# Patient Record
Sex: FEMALE | Race: BLACK OR AFRICAN AMERICAN | NOT HISPANIC OR LATINO | Employment: OTHER | URBAN - METROPOLITAN AREA
[De-identification: names, ages, dates, MRNs, and addresses within clinical notes are randomized per-mention and may not be internally consistent; named-entity substitution may affect disease eponyms.]

---

## 2025-05-01 ENCOUNTER — APPOINTMENT (OUTPATIENT)
Dept: RADIOLOGY | Facility: HOSPITAL | Age: 89
End: 2025-05-01
Payer: MEDICARE

## 2025-05-01 ENCOUNTER — HOSPITAL ENCOUNTER (OUTPATIENT)
Facility: HOSPITAL | Age: 89
Setting detail: OBSERVATION
Discharge: HOME | End: 2025-05-02
Attending: EMERGENCY MEDICINE | Admitting: STUDENT IN AN ORGANIZED HEALTH CARE EDUCATION/TRAINING PROGRAM
Payer: MEDICARE

## 2025-05-01 ENCOUNTER — CLINICAL SUPPORT (OUTPATIENT)
Dept: EMERGENCY MEDICINE | Facility: HOSPITAL | Age: 89
End: 2025-05-01
Payer: MEDICARE

## 2025-05-01 DIAGNOSIS — I10 PRIMARY HYPERTENSION: ICD-10-CM

## 2025-05-01 DIAGNOSIS — I26.99 BILATERAL PULMONARY EMBOLISM (MULTI): Primary | ICD-10-CM

## 2025-05-01 DIAGNOSIS — I26.99 PE (PULMONARY THROMBOEMBOLISM) (MULTI): ICD-10-CM

## 2025-05-01 DIAGNOSIS — I26.94 MULTIPLE SUBSEGMENTAL PULMONARY EMBOLI WITHOUT ACUTE COR PULMONALE: ICD-10-CM

## 2025-05-01 PROBLEM — H40.003 GLAUCOMA SUSPECT, BOTH EYES: Status: ACTIVE | Noted: 2025-05-01

## 2025-05-01 PROBLEM — N18.31 STAGE 3A CHRONIC KIDNEY DISEASE (MULTI): Status: ACTIVE | Noted: 2025-05-01

## 2025-05-01 PROBLEM — E78.5 DYSLIPIDEMIA: Status: ACTIVE | Noted: 2025-05-01

## 2025-05-01 PROBLEM — D64.9 ANEMIA: Status: ACTIVE | Noted: 2025-05-01

## 2025-05-01 PROBLEM — H26.9 CATARACT: Status: ACTIVE | Noted: 2025-05-01

## 2025-05-01 PROBLEM — M85.80 OSTEOPENIA: Status: ACTIVE | Noted: 2025-05-01

## 2025-05-01 LAB
ALBUMIN SERPL BCP-MCNC: 3.7 G/DL (ref 3.4–5)
ALP SERPL-CCNC: 76 U/L (ref 33–136)
ALT SERPL W P-5'-P-CCNC: 10 U/L (ref 7–45)
ANION GAP BLDV CALCULATED.4IONS-SCNC: 10 MMOL/L (ref 10–25)
ANION GAP SERPL CALC-SCNC: 14 MMOL/L (ref 10–20)
AST SERPL W P-5'-P-CCNC: 14 U/L (ref 9–39)
ATRIAL RATE: 86 BPM
BASE EXCESS BLDV CALC-SCNC: -1.1 MMOL/L (ref -2–3)
BASOPHILS # BLD AUTO: 0.02 X10*3/UL (ref 0–0.1)
BASOPHILS NFR BLD AUTO: 0.3 %
BILIRUB SERPL-MCNC: 0.5 MG/DL (ref 0–1.2)
BNP SERPL-MCNC: 166 PG/ML (ref 0–99)
BODY TEMPERATURE: 37 DEGREES CELSIUS
BUN SERPL-MCNC: 19 MG/DL (ref 6–23)
CA-I BLDV-SCNC: 1.15 MMOL/L (ref 1.1–1.33)
CALCIUM SERPL-MCNC: 8.8 MG/DL (ref 8.6–10.6)
CARDIAC TROPONIN I PNL SERPL HS: 41 NG/L (ref 0–34)
CARDIAC TROPONIN I PNL SERPL HS: 60 NG/L (ref 0–34)
CHLORIDE BLDV-SCNC: 105 MMOL/L (ref 98–107)
CHLORIDE SERPL-SCNC: 104 MMOL/L (ref 98–107)
CO2 SERPL-SCNC: 23 MMOL/L (ref 21–32)
CREAT SERPL-MCNC: 1.13 MG/DL (ref 0.5–1.05)
D DIMER PPP FEU-MCNC: 7604 NG/ML FEU
EGFRCR SERPLBLD CKD-EPI 2021: 47 ML/MIN/1.73M*2
EOSINOPHIL # BLD AUTO: 0.02 X10*3/UL (ref 0–0.4)
EOSINOPHIL NFR BLD AUTO: 0.3 %
ERYTHROCYTE [DISTWIDTH] IN BLOOD BY AUTOMATED COUNT: 13.3 % (ref 11.5–14.5)
GLUCOSE BLDV-MCNC: 96 MG/DL (ref 74–99)
GLUCOSE SERPL-MCNC: 97 MG/DL (ref 74–99)
HCO3 BLDV-SCNC: 23.5 MMOL/L (ref 22–26)
HCT VFR BLD AUTO: 31 % (ref 36–46)
HCT VFR BLD EST: 35 % (ref 36–46)
HGB BLD-MCNC: 10.7 G/DL (ref 12–16)
HGB BLDV-MCNC: 11.5 G/DL (ref 12–16)
IMM GRANULOCYTES # BLD AUTO: 0.03 X10*3/UL (ref 0–0.5)
IMM GRANULOCYTES NFR BLD AUTO: 0.4 % (ref 0–0.9)
INHALED O2 CONCENTRATION: 36 %
LACTATE BLDV-SCNC: 1.5 MMOL/L (ref 0.4–2)
LYMPHOCYTES # BLD AUTO: 0.75 X10*3/UL (ref 0.8–3)
LYMPHOCYTES NFR BLD AUTO: 10.5 %
MCH RBC QN AUTO: 30.5 PG (ref 26–34)
MCHC RBC AUTO-ENTMCNC: 34.5 G/DL (ref 32–36)
MCV RBC AUTO: 88 FL (ref 80–100)
MONOCYTES # BLD AUTO: 0.58 X10*3/UL (ref 0.05–0.8)
MONOCYTES NFR BLD AUTO: 8.1 %
NEUTROPHILS # BLD AUTO: 5.75 X10*3/UL (ref 1.6–5.5)
NEUTROPHILS NFR BLD AUTO: 80.4 %
NRBC BLD-RTO: 0 /100 WBCS (ref 0–0)
OXYHGB MFR BLDV: 59.1 % (ref 45–75)
P AXIS: 80 DEGREES
P OFFSET: 198 MS
P ONSET: 136 MS
PCO2 BLDV: 38 MM HG (ref 41–51)
PH BLDV: 7.4 PH (ref 7.33–7.43)
PLATELET # BLD AUTO: 257 X10*3/UL (ref 150–450)
PO2 BLDV: 39 MM HG (ref 35–45)
POTASSIUM BLDV-SCNC: 4.1 MMOL/L (ref 3.5–5.3)
POTASSIUM SERPL-SCNC: 4 MMOL/L (ref 3.5–5.3)
PR INTERVAL: 166 MS
PROT SERPL-MCNC: 7.2 G/DL (ref 6.4–8.2)
Q ONSET: 219 MS
QRS COUNT: 14 BEATS
QRS DURATION: 88 MS
QT INTERVAL: 374 MS
QTC CALCULATION(BAZETT): 447 MS
QTC FREDERICIA: 421 MS
R AXIS: 24 DEGREES
RBC # BLD AUTO: 3.51 X10*6/UL (ref 4–5.2)
SAO2 % BLDV: 61 % (ref 45–75)
SODIUM BLDV-SCNC: 134 MMOL/L (ref 136–145)
SODIUM SERPL-SCNC: 137 MMOL/L (ref 136–145)
T AXIS: 54 DEGREES
T OFFSET: 406 MS
UFH PPP CHRO-ACNC: 1 IU/ML (ref ?–1.1)
VENTRICULAR RATE: 86 BPM
WBC # BLD AUTO: 7.2 X10*3/UL (ref 4.4–11.3)

## 2025-05-01 PROCEDURE — G0378 HOSPITAL OBSERVATION PER HR: HCPCS

## 2025-05-01 PROCEDURE — 83880 ASSAY OF NATRIURETIC PEPTIDE: CPT

## 2025-05-01 PROCEDURE — 2500000001 HC RX 250 WO HCPCS SELF ADMINISTERED DRUGS (ALT 637 FOR MEDICARE OP)

## 2025-05-01 PROCEDURE — 85520 HEPARIN ASSAY: CPT

## 2025-05-01 PROCEDURE — 84484 ASSAY OF TROPONIN QUANT: CPT

## 2025-05-01 PROCEDURE — 71275 CT ANGIOGRAPHY CHEST: CPT | Mod: FOREIGN READ | Performed by: RADIOLOGY

## 2025-05-01 PROCEDURE — 82330 ASSAY OF CALCIUM: CPT | Performed by: EMERGENCY MEDICINE

## 2025-05-01 PROCEDURE — 99223 1ST HOSP IP/OBS HIGH 75: CPT | Performed by: NURSE PRACTITIONER

## 2025-05-01 PROCEDURE — 85025 COMPLETE CBC W/AUTO DIFF WBC: CPT

## 2025-05-01 PROCEDURE — 36415 COLL VENOUS BLD VENIPUNCTURE: CPT

## 2025-05-01 PROCEDURE — 71045 X-RAY EXAM CHEST 1 VIEW: CPT

## 2025-05-01 PROCEDURE — 2550000001 HC RX 255 CONTRASTS: Performed by: EMERGENCY MEDICINE

## 2025-05-01 PROCEDURE — 71045 X-RAY EXAM CHEST 1 VIEW: CPT | Mod: FOREIGN READ | Performed by: RADIOLOGY

## 2025-05-01 PROCEDURE — 93010 ELECTROCARDIOGRAM REPORT: CPT | Performed by: EMERGENCY MEDICINE

## 2025-05-01 PROCEDURE — 99285 EMERGENCY DEPT VISIT HI MDM: CPT | Mod: 25 | Performed by: EMERGENCY MEDICINE

## 2025-05-01 PROCEDURE — 84132 ASSAY OF SERUM POTASSIUM: CPT

## 2025-05-01 PROCEDURE — 2500000004 HC RX 250 GENERAL PHARMACY W/ HCPCS (ALT 636 FOR OP/ED): Mod: JZ

## 2025-05-01 PROCEDURE — 84132 ASSAY OF SERUM POTASSIUM: CPT | Performed by: EMERGENCY MEDICINE

## 2025-05-01 PROCEDURE — 93308 TTE F-UP OR LMTD: CPT | Performed by: SURGERY

## 2025-05-01 PROCEDURE — 82435 ASSAY OF BLOOD CHLORIDE: CPT

## 2025-05-01 PROCEDURE — 96376 TX/PRO/DX INJ SAME DRUG ADON: CPT

## 2025-05-01 PROCEDURE — 2500000001 HC RX 250 WO HCPCS SELF ADMINISTERED DRUGS (ALT 637 FOR MEDICARE OP): Performed by: NURSE PRACTITIONER

## 2025-05-01 PROCEDURE — 2500000001 HC RX 250 WO HCPCS SELF ADMINISTERED DRUGS (ALT 637 FOR MEDICARE OP): Performed by: STUDENT IN AN ORGANIZED HEALTH CARE EDUCATION/TRAINING PROGRAM

## 2025-05-01 PROCEDURE — 99285 EMERGENCY DEPT VISIT HI MDM: CPT | Performed by: EMERGENCY MEDICINE

## 2025-05-01 PROCEDURE — 93005 ELECTROCARDIOGRAM TRACING: CPT

## 2025-05-01 PROCEDURE — 36415 COLL VENOUS BLD VENIPUNCTURE: CPT | Performed by: EMERGENCY MEDICINE

## 2025-05-01 PROCEDURE — 71275 CT ANGIOGRAPHY CHEST: CPT

## 2025-05-01 PROCEDURE — 85379 FIBRIN DEGRADATION QUANT: CPT

## 2025-05-01 RX ORDER — CLONIDINE HYDROCHLORIDE 0.1 MG/1
0.1 TABLET ORAL ONCE
Status: COMPLETED | OUTPATIENT
Start: 2025-05-01 | End: 2025-05-01

## 2025-05-01 RX ORDER — AMOXICILLIN 250 MG
2 CAPSULE ORAL 2 TIMES DAILY
Status: DISCONTINUED | OUTPATIENT
Start: 2025-05-01 | End: 2025-05-02 | Stop reason: HOSPADM

## 2025-05-01 RX ORDER — HEPARIN SODIUM 10000 [USP'U]/100ML
0-4500 INJECTION, SOLUTION INTRAVENOUS CONTINUOUS
Status: DISCONTINUED | OUTPATIENT
Start: 2025-05-01 | End: 2025-05-02

## 2025-05-01 RX ORDER — HYDROCHLOROTHIAZIDE 25 MG/1
25 TABLET ORAL DAILY
Status: DISCONTINUED | OUTPATIENT
Start: 2025-05-01 | End: 2025-05-02 | Stop reason: HOSPADM

## 2025-05-01 RX ORDER — AMLODIPINE BESYLATE 10 MG/1
10 TABLET ORAL ONCE
Status: COMPLETED | OUTPATIENT
Start: 2025-05-01 | End: 2025-05-01

## 2025-05-01 RX ORDER — CHLORTHALIDONE 25 MG/1
1 TABLET ORAL DAILY
COMMUNITY
Start: 2019-04-08 | End: 2025-05-01 | Stop reason: ALTCHOICE

## 2025-05-01 RX ORDER — HYDROCHLOROTHIAZIDE 25 MG/1
25 TABLET ORAL DAILY
Status: ON HOLD | COMMUNITY
End: 2025-05-02

## 2025-05-01 RX ORDER — HYDRALAZINE HYDROCHLORIDE 25 MG/1
25 TABLET, FILM COATED ORAL EVERY 6 HOURS PRN
Status: DISCONTINUED | OUTPATIENT
Start: 2025-05-01 | End: 2025-05-02 | Stop reason: HOSPADM

## 2025-05-01 RX ADMIN — CLONIDINE HYDROCHLORIDE 0.1 MG: 0.1 TABLET ORAL at 20:39

## 2025-05-01 RX ADMIN — IOHEXOL 67 ML: 350 INJECTION, SOLUTION INTRAVENOUS at 11:54

## 2025-05-01 RX ADMIN — AMLODIPINE BESYLATE 10 MG: 10 TABLET ORAL at 11:26

## 2025-05-01 RX ADMIN — HEPARIN SODIUM 1100 UNITS/HR: 10000 INJECTION, SOLUTION INTRAVENOUS at 13:03

## 2025-05-01 RX ADMIN — HYDRALAZINE HYDROCHLORIDE 25 MG: 25 TABLET ORAL at 18:36

## 2025-05-01 RX ADMIN — HYDROCHLOROTHIAZIDE 25 MG: 25 TABLET ORAL at 15:58

## 2025-05-01 SDOH — ECONOMIC STABILITY: FOOD INSECURITY: WITHIN THE PAST 12 MONTHS, YOU WORRIED THAT YOUR FOOD WOULD RUN OUT BEFORE YOU GOT THE MONEY TO BUY MORE.: NEVER TRUE

## 2025-05-01 SDOH — SOCIAL STABILITY: SOCIAL INSECURITY: WITHIN THE LAST YEAR, HAVE YOU BEEN HUMILIATED OR EMOTIONALLY ABUSED IN OTHER WAYS BY YOUR PARTNER OR EX-PARTNER?: NO

## 2025-05-01 SDOH — SOCIAL STABILITY: SOCIAL INSECURITY: ABUSE: ADULT

## 2025-05-01 SDOH — SOCIAL STABILITY: SOCIAL INSECURITY: DO YOU FEEL UNSAFE GOING BACK TO THE PLACE WHERE YOU ARE LIVING?: NO

## 2025-05-01 SDOH — SOCIAL STABILITY: SOCIAL INSECURITY
WITHIN THE LAST YEAR, HAVE YOU BEEN KICKED, HIT, SLAPPED, OR OTHERWISE PHYSICALLY HURT BY YOUR PARTNER OR EX-PARTNER?: NO

## 2025-05-01 SDOH — ECONOMIC STABILITY: FOOD INSECURITY: HOW HARD IS IT FOR YOU TO PAY FOR THE VERY BASICS LIKE FOOD, HOUSING, MEDICAL CARE, AND HEATING?: NOT VERY HARD

## 2025-05-01 SDOH — SOCIAL STABILITY: SOCIAL INSECURITY: HAS ANYONE EVER THREATENED TO HURT YOUR FAMILY OR YOUR PETS?: NO

## 2025-05-01 SDOH — SOCIAL STABILITY: SOCIAL INSECURITY: HAVE YOU HAD ANY THOUGHTS OF HARMING ANYONE ELSE?: NO

## 2025-05-01 SDOH — ECONOMIC STABILITY: HOUSING INSECURITY: AT ANY TIME IN THE PAST 12 MONTHS, WERE YOU HOMELESS OR LIVING IN A SHELTER (INCLUDING NOW)?: NO

## 2025-05-01 SDOH — ECONOMIC STABILITY: TRANSPORTATION INSECURITY: IN THE PAST 12 MONTHS, HAS LACK OF TRANSPORTATION KEPT YOU FROM MEDICAL APPOINTMENTS OR FROM GETTING MEDICATIONS?: NO

## 2025-05-01 SDOH — SOCIAL STABILITY: SOCIAL INSECURITY: WITHIN THE LAST YEAR, HAVE YOU BEEN AFRAID OF YOUR PARTNER OR EX-PARTNER?: NO

## 2025-05-01 SDOH — SOCIAL STABILITY: SOCIAL INSECURITY: DO YOU FEEL ANYONE HAS EXPLOITED OR TAKEN ADVANTAGE OF YOU FINANCIALLY OR OF YOUR PERSONAL PROPERTY?: NO

## 2025-05-01 SDOH — ECONOMIC STABILITY: HOUSING INSECURITY: IN THE LAST 12 MONTHS, WAS THERE A TIME WHEN YOU WERE NOT ABLE TO PAY THE MORTGAGE OR RENT ON TIME?: NO

## 2025-05-01 SDOH — SOCIAL STABILITY: SOCIAL INSECURITY: WERE YOU ABLE TO COMPLETE ALL THE BEHAVIORAL HEALTH SCREENINGS?: YES

## 2025-05-01 SDOH — ECONOMIC STABILITY: INCOME INSECURITY: IN THE PAST 12 MONTHS HAS THE ELECTRIC, GAS, OIL, OR WATER COMPANY THREATENED TO SHUT OFF SERVICES IN YOUR HOME?: NO

## 2025-05-01 SDOH — ECONOMIC STABILITY: FOOD INSECURITY: WITHIN THE PAST 12 MONTHS, THE FOOD YOU BOUGHT JUST DIDN'T LAST AND YOU DIDN'T HAVE MONEY TO GET MORE.: NEVER TRUE

## 2025-05-01 SDOH — SOCIAL STABILITY: SOCIAL INSECURITY: DOES ANYONE TRY TO KEEP YOU FROM HAVING/CONTACTING OTHER FRIENDS OR DOING THINGS OUTSIDE YOUR HOME?: NO

## 2025-05-01 SDOH — SOCIAL STABILITY: SOCIAL INSECURITY
WITHIN THE LAST YEAR, HAVE YOU BEEN RAPED OR FORCED TO HAVE ANY KIND OF SEXUAL ACTIVITY BY YOUR PARTNER OR EX-PARTNER?: NO

## 2025-05-01 SDOH — SOCIAL STABILITY: SOCIAL INSECURITY: HAVE YOU HAD THOUGHTS OF HARMING ANYONE ELSE?: NO

## 2025-05-01 SDOH — SOCIAL STABILITY: SOCIAL INSECURITY: ARE THERE ANY APPARENT SIGNS OF INJURIES/BEHAVIORS THAT COULD BE RELATED TO ABUSE/NEGLECT?: NO

## 2025-05-01 SDOH — ECONOMIC STABILITY: HOUSING INSECURITY: IN THE PAST 12 MONTHS, HOW MANY TIMES HAVE YOU MOVED WHERE YOU WERE LIVING?: 0

## 2025-05-01 SDOH — SOCIAL STABILITY: SOCIAL INSECURITY: ARE YOU OR HAVE YOU BEEN THREATENED OR ABUSED PHYSICALLY, EMOTIONALLY, OR SEXUALLY BY ANYONE?: NO

## 2025-05-01 ASSESSMENT — ACTIVITIES OF DAILY LIVING (ADL)
DRESSING YOURSELF: INDEPENDENT
GROOMING: INDEPENDENT
HEARING - LEFT EAR: FUNCTIONAL
TOILETING: INDEPENDENT
WALKS IN HOME: INDEPENDENT
ADEQUATE_TO_COMPLETE_ADL: YES
HEARING - RIGHT EAR: FUNCTIONAL
PATIENT'S MEMORY ADEQUATE TO SAFELY COMPLETE DAILY ACTIVITIES?: YES
ASSISTIVE_DEVICE: EYEGLASSES
BATHING: INDEPENDENT
FEEDING YOURSELF: INDEPENDENT
JUDGMENT_ADEQUATE_SAFELY_COMPLETE_DAILY_ACTIVITIES: YES
LACK_OF_TRANSPORTATION: NO

## 2025-05-01 ASSESSMENT — ENCOUNTER SYMPTOMS
GASTROINTESTINAL NEGATIVE: 1
CONSTITUTIONAL NEGATIVE: 1
MUSCULOSKELETAL NEGATIVE: 1
SHORTNESS OF BREATH: 1
EYES NEGATIVE: 1
NEUROLOGICAL NEGATIVE: 1
CARDIOVASCULAR NEGATIVE: 1

## 2025-05-01 ASSESSMENT — COGNITIVE AND FUNCTIONAL STATUS - GENERAL
MOBILITY SCORE: 20
DAILY ACTIVITIY SCORE: 22
DRESSING REGULAR UPPER BODY CLOTHING: A LITTLE
STANDING UP FROM CHAIR USING ARMS: A LITTLE
MOVING TO AND FROM BED TO CHAIR: A LITTLE
DRESSING REGULAR UPPER BODY CLOTHING: A LITTLE
PERSONAL GROOMING: A LITTLE
DAILY ACTIVITIY SCORE: 20
TOILETING: A LITTLE
PATIENT BASELINE BEDBOUND: NO
MOBILITY SCORE: 20
CLIMB 3 TO 5 STEPS WITH RAILING: A LITTLE
TOILETING: A LITTLE
CLIMB 3 TO 5 STEPS WITH RAILING: A LITTLE
MOVING TO AND FROM BED TO CHAIR: A LITTLE
STANDING UP FROM CHAIR USING ARMS: A LITTLE
WALKING IN HOSPITAL ROOM: A LITTLE
WALKING IN HOSPITAL ROOM: A LITTLE
EATING MEALS: A LITTLE

## 2025-05-01 ASSESSMENT — LIFESTYLE VARIABLES
HAVE YOU EVER FELT YOU SHOULD CUT DOWN ON YOUR DRINKING: NO
AUDIT-C TOTAL SCORE: 0
TOTAL SCORE: 0
EVER FELT BAD OR GUILTY ABOUT YOUR DRINKING: NO
HOW OFTEN DO YOU HAVE 6 OR MORE DRINKS ON ONE OCCASION: NEVER
EVER HAD A DRINK FIRST THING IN THE MORNING TO STEADY YOUR NERVES TO GET RID OF A HANGOVER: NO
HOW MANY STANDARD DRINKS CONTAINING ALCOHOL DO YOU HAVE ON A TYPICAL DAY: PATIENT DOES NOT DRINK
SKIP TO QUESTIONS 9-10: 1
SUBSTANCE_ABUSE_PAST_12_MONTHS: NO
HOW OFTEN DO YOU HAVE A DRINK CONTAINING ALCOHOL: NEVER
PRESCIPTION_ABUSE_PAST_12_MONTHS: NO
AUDIT-C TOTAL SCORE: 0
HAVE PEOPLE ANNOYED YOU BY CRITICIZING YOUR DRINKING: NO

## 2025-05-01 ASSESSMENT — PATIENT HEALTH QUESTIONNAIRE - PHQ9
SUM OF ALL RESPONSES TO PHQ9 QUESTIONS 1 & 2: 0
1. LITTLE INTEREST OR PLEASURE IN DOING THINGS: NOT AT ALL
2. FEELING DOWN, DEPRESSED OR HOPELESS: NOT AT ALL

## 2025-05-01 ASSESSMENT — COLUMBIA-SUICIDE SEVERITY RATING SCALE - C-SSRS
6. HAVE YOU EVER DONE ANYTHING, STARTED TO DO ANYTHING, OR PREPARED TO DO ANYTHING TO END YOUR LIFE?: NO
2. HAVE YOU ACTUALLY HAD ANY THOUGHTS OF KILLING YOURSELF?: NO
1. IN THE PAST MONTH, HAVE YOU WISHED YOU WERE DEAD OR WISHED YOU COULD GO TO SLEEP AND NOT WAKE UP?: NO

## 2025-05-01 ASSESSMENT — PAIN - FUNCTIONAL ASSESSMENT: PAIN_FUNCTIONAL_ASSESSMENT: 0-10

## 2025-05-01 ASSESSMENT — PAIN SCALES - GENERAL
PAINLEVEL_OUTOF10: 0 - NO PAIN
PAINLEVEL_OUTOF10: 0 - NO PAIN

## 2025-05-01 NOTE — H&P
HPI     Ms Barrow is a 89y female with PMHx: HTN who presents to the ED today with complaint of shortness of breath for the past 3 weeks.  Is any trips car rides falls or injuries prior to this.  States no history of PEs or DVTs.  Patient has no other complaints.    While in the ED patient's vitals were mainly stable with her BP being elevated at 230/115 ->190/88.  Labs were mainly negative also with her creatinine at 1.13 although she does have some remote history stating that she likely has CKD.  Opponents were 41-60 this is likely in the setting of PE.  D-dimer was over 7000.  Hgb 10.7.  CXR showed some mild pulmonary congestion with an enlarged cardiac silhouette.  CT for PE showed extensive bilateral pulmonary emboli involving all lobes distal to the main pulmonary arteries with moderate embolic burden.  To be admitted observation for PE    ROS/EXAM     Review of Systems   Constitutional: Negative.    HENT: Negative.     Eyes: Negative.    Respiratory:  Positive for shortness of breath.    Cardiovascular: Negative.    Gastrointestinal: Negative.    Genitourinary: Negative.    Musculoskeletal: Negative.    Skin: Negative.    Neurological: Negative.    All other systems reviewed and are negative.    Physical Exam  Vitals reviewed.   Constitutional:       Appearance: Normal appearance.   HENT:      Head: Normocephalic.      Mouth/Throat:      Mouth: Mucous membranes are dry.   Eyes:      Extraocular Movements: Extraocular movements intact.      Conjunctiva/sclera: Conjunctivae normal.      Pupils: Pupils are equal, round, and reactive to light.   Cardiovascular:      Rate and Rhythm: Normal rate and regular rhythm.      Pulses:  "Normal pulses.      Heart sounds: Normal heart sounds, S1 normal and S2 normal.   Pulmonary:      Effort: Pulmonary effort is normal.      Breath sounds: Normal breath sounds and air entry.   Abdominal:      General: Abdomen is flat. Bowel sounds are normal.      Palpations: Abdomen is soft.   Musculoskeletal:         General: Normal range of motion.      Cervical back: Full passive range of motion without pain and normal range of motion.   Skin:     General: Skin is warm and dry.   Neurological:      General: No focal deficit present.      Mental Status: She is alert and oriented to person, place, and time. Mental status is at baseline.   Psychiatric:         Attention and Perception: Attention normal.         Mood and Affect: Mood normal.         Speech: Speech normal.         Histories     Medical History[1]  Surgical History[2]  Family History[3]       Vitals/LABS/RESULTS     Last Recorded Vitals  Blood pressure (!) 190/88, pulse 69, temperature 36.5 °C (97.7 °F), temperature source Temporal, resp. rate 19, height 1.6 m (5' 3\"), weight 63.5 kg (140 lb), SpO2 98%.  Intake/Output last 3 Shifts:  No intake/output data recorded.    Relevant Results  Lab Results   Component Value Date    WBC 7.2 05/01/2025    HGB 10.7 (L) 05/01/2025    HCT 31.0 (L) 05/01/2025    MCV 88 05/01/2025     05/01/2025      Lab Results   Component Value Date    GLUCOSE 97 05/01/2025    CALCIUM 8.8 05/01/2025     05/01/2025    K 4.0 05/01/2025    CO2 23 05/01/2025     05/01/2025    BUN 19 05/01/2025    CREATININE 1.13 (H) 05/01/2025     Imaging  CT angio chest for pulmonary embolism  Result Date: 5/1/2025  1.Extensive bilateral pulmonary emboli involving all lobes distal to the main pulmonary arteries with moderate embolic burden. 2.Mild diffuse cardiac enlargement with question mild right heart dilation.  Question mild right heart strain. 3.Patchy groundglass areas airspace consolidation in the superior segment right lower " lobe. Correlate clinically for possible small pulmonary infarct. 4.Mild emphysema. 5.Severe coronary artery calcifications. 6.Findings discussed with and acknowledged by Dr. Chacko at 12:53 PM Signed by Bobby Shaw MD    XR chest 1 view  Result Date: 5/1/2025  *Cardiac silhouette enlarged, new since the comparison examination. *Mild pulmonary vascular congestion. Signed by Willem Page MD      Cardiology, Vascular, and Other Imaging  Point of Care Ultrasound  Result Date: 5/1/2025  Marielos Lovell MD     5/1/2025  2:27 PM Performed by: Aviva Thornton MD Authorized by: Mary Platt MD  Cardiac Indications: rule out right heart strain Procedure: Cardiac Ultrasound Findings:  Views: parasternal long, parasternal short, apical four and subxiphoid Pericardial effusion: trace pericardial effusion. Activity: Ventricular contractions were visualized. LV: LV systolic function was NORMAL. RV: RV size was NORMAL. Impression: Cardiac: The focused cardiac ultrasound exam was NORMAL. Comments: No obvious right heart strain seen on ultrasound imaging.        Medications     Scheduled medications  Scheduled Medications[4]  Continuous medications  Continuous Medications[5]  PRN medications  PRN Medications[6]    PLAN   Assessment/Plan:  Ms Barrow is a 89y female with PMHx: HTN who presents to the ED today with complaint of shortness of breath for the past 3 weeks.  Is any trips car rides falls or injuries prior to this.  States no history of PEs or DVTs.  Patient has no other complaints.    While in the ED patient's vitals were mainly stable with her BP being elevated at 230/115 ->190/88.  Labs were mainly negative also with her creatinine at 1.13 although she does have some remote history stating that she likely has CKD.  Opponents were 41-60 this is likely in the setting of PE.  D-dimer was over 7000.  Hgb 10.7.  CXR showed some mild pulmonary congestion with an enlarged cardiac silhouette.  CT for PE showed extensive  bilateral pulmonary emboli involving all lobes distal to the main pulmonary arteries with moderate embolic burden.  To be admitted observation for PE  Assessment & Plan  PE (pulmonary thromboembolism) (Multi)  - CT shows extensive bilateral pulmonary emboli involving all lobes distal to the main pulmonary artery with moderate embolic burden  -Will order echocardiogram  -Heparin drip will transition to p.o.  -Per ED nursing staff patient desats to 80% as soon as she is taken off the oxygen  Stage 3a chronic kidney disease (Multi)  - Creatinine 1.13 stable  Anemia  - Hemoglobin 10.7 stable  -No acute bleeding noted  Cataract  Glaucoma suspect, both eyes  - No home meds  Dyslipidemia  Hypertension  - Continue home hydrochlorothiazide 25 mg daily  - Will order hydralazine 25 mg p.o. every 6 hours as needed for SBP over 185    Fluids: monitor and replete as needed  Electrolytes: monitor and replete as needed  Nutrition: Regular diet   GI prophylaxis: as needed  DVT prophylaxis: SCD/heparin drip  Code Status: full code  PCP  Pharmacy    Disposition:  Admitted for above diagnoses. Plan per above. Anticipate observation      Char Palma, APRN-CNP         I spent 75 minutes in the professional and overall care on this encounter before, during and after the visit, examining the patient, reviewing labs, writing orders and documenting the note      This note was transcribed using the Dragon Dictation system. There may be grammatical, punctuation, or verbiage errors that can occur with voice recognition programs.              [1]   Past Medical History:  Diagnosis Date    Abnormal findings on diagnostic imaging of other specified body structures 04/18/2022    Abnormal CXR (chest x-ray)    Illness, unspecified 04/18/2022    Taking medication for chronic disease    Unspecified cataract 10/02/2015    Cataract of left eye   [2]   Past Surgical History:  Procedure Laterality Date    MASTECTOMY  10/21/2013    Breast Surgery  Mastectomy   [3] No family history on file.  [4] hydroCHLOROthiazide, 25 mg, oral, Daily  sennosides-docusate sodium, 2 tablet, oral, BID    [5] heparin, 0-4,500 Units/hr, Last Rate: 1,100 Units/hr (05/01/25 1303)    [6] PRN medications: heparin

## 2025-05-01 NOTE — CARE PLAN
The patient's goals for the shift include Patient will be free fromfalls injury this shift    The clinical goals for the shift include Patient will have no c/o of SOB Pulse-ox>90% this shift     Patient pulse ox 98%-99% 0n 2LO2NC  this shift  denies any SOB

## 2025-05-01 NOTE — CARE PLAN
The patient's goals for the shift include Patient will be free fromfalls injury this shift    The clinical goals for the shift include Patient will have no c/o of SOB Pulse-ox>90% this shift     Patient Admitted to [facility] 20 from the ED on a Heparin drip infusing at 1100 units/Hr.  Patient oriented to room/surroundings , Patient instructed to call for assistance prior to ambulating out of bed  Call light within reach

## 2025-05-01 NOTE — ED PROVIDER NOTES
HPI   Chief Complaint   Patient presents with    Chest Pain    Shortness of Breath       Patient is a 89yo female with past medical history of hypertension (no medications last year) presenting with concern for shortness of breath and exertional chest pain for approximately 2 weeks.  Reports no clear provocation of symptoms but reports they have been gradually worse for the last 2 weeks.  Reports both symptoms are particularly worse with going up stairs or exertion.  Does not report history of cardiac complications.  Reports she was taken off her blood pressure medication year ago because it was reportedly well-controlled.  Unsure the name of the medication reports she was on a 25 mg dose.  Reports no history of blood clots, no lower extremity swelling.  Reports no infectious symptoms denies fevers, chills, cough, congestion.  Reports no smoking history in the last 50 years and denies history of COPD.            Patient History   Medical History[1]  Surgical History[2]  Family History[3]  Social History[4]    Physical Exam   ED Triage Vitals [05/01/25 0916]   Temperature Heart Rate Respirations BP   36.2 °C (97.2 °F) 92 17 (!) 201/109      Pulse Ox Temp Source Heart Rate Source Patient Position   (!) 90 % Temporal Monitor Sitting      BP Location FiO2 (%)     Left arm --       Physical Exam  Constitutional:       Appearance: Normal appearance.   HENT:      Head: Normocephalic and atraumatic.   Eyes:      Extraocular Movements: Extraocular movements intact.   Cardiovascular:      Rate and Rhythm: Normal rate and regular rhythm.   Pulmonary:      Effort: Pulmonary effort is normal.      Comments: Clear to auscultation bilaterally, no appreciable wheeze or other adventitious lung sounds.  Satting appropriately on 2 L nasal cannula.  Musculoskeletal:         General: Normal range of motion.      Cervical back: Normal range of motion.   Skin:     General: Skin is warm and dry.   Neurological:      General: No focal  deficit present.      Mental Status: She is alert and oriented to person, place, and time.   Psychiatric:         Mood and Affect: Mood normal.         Behavior: Behavior normal.           ED Course & MDM   Diagnoses as of 05/01/25 2151   Bilateral pulmonary embolism (Multi)                 No data recorded     [place] Coma Scale Score: 15 (05/01/25 0915 : Char Mello RN)                           Medical Decision Making  EKG shows a normal rate of 86bpm, normal sinus rhythm, normal axis, [place] 166ms, QRS 88 ms, QTc 447 ms. Prominent anterolateral QRS amplitude consistent with LVH, otherwise normal ST and T wave pattern with no evidence of acute ischemia or other acute findings.    Patient is a 89yo female with past medical history of hypertension (no medications last year) presenting with concern for shortness of breath and exertional chest pain for approximately 2 weeks.  Workup most notable for markedly elevated D-dimer with CT angio with bilateral pulmonary emboli more significant on the right side.  Satting appropriately on between 0 and 2 L nasal cannula.  Does have some markers concerning for possible right heart strain with elevated troponin, elevated BNP, RV to LV ratio approximately 0.92.  Point-of-care ultrasound otherwise without evidence of right heart strain and given diffuse distribution of PE, patient not felt to be good candidate for thrombectomy or cath directed thrombolysis and PERT consultation deferred at this time.  Heparin initiated given concern for needing inpatient management with hypoxia, evidence of strain on heart, patient's advanced age.  Case discussed with medicine and patient admitted for further management.    Patient seen and discussed with Dr. Giulia Chacko MD, PhD  Emergency Medicine PGY3          Procedure  Procedures       [1]   Past Medical History:  Diagnosis Date    Abnormal findings on diagnostic imaging of other specified body structures 04/18/2022    Abnormal CXR  (chest x-ray)    Illness, unspecified 04/18/2022    Taking medication for chronic disease    Unspecified cataract 10/02/2015    Cataract of left eye   [2]   Past Surgical History:  Procedure Laterality Date    MASTECTOMY  10/21/2013    Breast Surgery Mastectomy   [3] No family history on file.  [4]   Social History  Tobacco Use    Smoking status: Not on file    Smokeless tobacco: Not on file   Substance Use Topics    Alcohol use: Not on file    Drug use: Not on file        Chucky Chacko MD  Resident  05/01/25 7548

## 2025-05-01 NOTE — ASSESSMENT & PLAN NOTE
- Continue home hydrochlorothiazide 25 mg daily  - Will order hydralazine 25 mg p.o. every 6 hours as needed for SBP over 185

## 2025-05-01 NOTE — ASSESSMENT & PLAN NOTE
- CT shows extensive bilateral pulmonary emboli involving all lobes distal to the main pulmonary artery with moderate embolic burden  -Will order echocardiogram  -Heparin drip will transition to p.o.  -Per ED nursing staff patient desats to 80% as soon as she is taken off the oxygen

## 2025-05-01 NOTE — PROCEDURES
Performed by: Aviva Thornton MD  Authorized by: Mary Platt MD    Cardiac Indications: rule out right heart strain                  Procedure: Cardiac Ultrasound    Findings:   Views: parasternal long, parasternal short, apical four and subxiphoid  Pericardial effusion: trace pericardial effusion.  Activity: Ventricular contractions were visualized.  LV: LV systolic function was NORMAL.  RV: RV size was NORMAL.    Impression:  Cardiac: The focused cardiac ultrasound exam was NORMAL.      Comments: No obvious right heart strain seen on ultrasound imaging.

## 2025-05-01 NOTE — ED TRIAGE NOTES
Pt presents with a c/o SOB and chest pain that started about 2 weeks ago. Pt states she has been off of her HTN meds for over a year, pt is currently pain free.

## 2025-05-02 ENCOUNTER — APPOINTMENT (OUTPATIENT)
Dept: CARDIOLOGY | Facility: HOSPITAL | Age: 89
End: 2025-05-02
Payer: MEDICARE

## 2025-05-02 VITALS
RESPIRATION RATE: 18 BRPM | HEART RATE: 71 BPM | SYSTOLIC BLOOD PRESSURE: 140 MMHG | DIASTOLIC BLOOD PRESSURE: 67 MMHG | HEIGHT: 63 IN | OXYGEN SATURATION: 97 % | TEMPERATURE: 97.9 F | BODY MASS INDEX: 24.8 KG/M2 | WEIGHT: 140 LBS

## 2025-05-02 LAB
AORTIC VALVE PEAK VELOCITY: 1.07 M/S
AV PEAK GRADIENT: 5 MMHG
AVA (PEAK VEL): 2.06 CM2
EJECTION FRACTION APICAL 4 CHAMBER: 58.7
EJECTION FRACTION: 58 %
LEFT ATRIUM VOLUME AREA LENGTH INDEX BSA: 31.7 ML/M2
LEFT VENTRICLE INTERNAL DIMENSION DIASTOLE: 3.9 CM (ref 3.5–6)
LEFT VENTRICULAR OUTFLOW TRACT DIAMETER: 2 CM
MITRAL VALVE E/A RATIO: 0.85
RIGHT VENTRICLE FREE WALL PEAK S': 7.7 CM/S
RIGHT VENTRICLE PEAK SYSTOLIC PRESSURE: 45.5 MMHG
TRICUSPID ANNULAR PLANE SYSTOLIC EXCURSION: 2 CM
UFH PPP CHRO-ACNC: 0.6 IU/ML (ref ?–1.1)
UFH PPP CHRO-ACNC: 0.6 IU/ML (ref ?–1.1)

## 2025-05-02 PROCEDURE — 85520 HEPARIN ASSAY: CPT

## 2025-05-02 PROCEDURE — 96365 THER/PROPH/DIAG IV INF INIT: CPT

## 2025-05-02 PROCEDURE — 2500000004 HC RX 250 GENERAL PHARMACY W/ HCPCS (ALT 636 FOR OP/ED): Mod: JZ

## 2025-05-02 PROCEDURE — 93306 TTE W/DOPPLER COMPLETE: CPT

## 2025-05-02 PROCEDURE — 2500000001 HC RX 250 WO HCPCS SELF ADMINISTERED DRUGS (ALT 637 FOR MEDICARE OP): Performed by: NURSE PRACTITIONER

## 2025-05-02 PROCEDURE — 96366 THER/PROPH/DIAG IV INF ADDON: CPT

## 2025-05-02 PROCEDURE — 36415 COLL VENOUS BLD VENIPUNCTURE: CPT

## 2025-05-02 PROCEDURE — 2500000001 HC RX 250 WO HCPCS SELF ADMINISTERED DRUGS (ALT 637 FOR MEDICARE OP): Performed by: STUDENT IN AN ORGANIZED HEALTH CARE EDUCATION/TRAINING PROGRAM

## 2025-05-02 PROCEDURE — G0378 HOSPITAL OBSERVATION PER HR: HCPCS

## 2025-05-02 RX ORDER — PANTOPRAZOLE SODIUM 40 MG/1
40 TABLET, DELAYED RELEASE ORAL 2 TIMES DAILY
Qty: 60 TABLET | Refills: 0 | Status: SHIPPED | OUTPATIENT
Start: 2025-05-02 | End: 2025-06-01

## 2025-05-02 RX ORDER — ACETAMINOPHEN 325 MG/1
650 TABLET ORAL EVERY 6 HOURS PRN
Status: DISCONTINUED | OUTPATIENT
Start: 2025-05-02 | End: 2025-05-02 | Stop reason: HOSPADM

## 2025-05-02 RX ORDER — HYDROCHLOROTHIAZIDE 25 MG/1
25 TABLET ORAL DAILY
Qty: 30 TABLET | Refills: 1 | Status: SHIPPED | OUTPATIENT
Start: 2025-05-02 | End: 2025-07-01

## 2025-05-02 RX ORDER — ACETAMINOPHEN 325 MG/1
650 TABLET ORAL EVERY 6 HOURS PRN
Start: 2025-05-02

## 2025-05-02 RX ADMIN — APIXABAN 10 MG: 5 TABLET, FILM COATED ORAL at 12:26

## 2025-05-02 RX ADMIN — HEPARIN SODIUM 1000 UNITS/HR: 10000 INJECTION, SOLUTION INTRAVENOUS at 06:14

## 2025-05-02 RX ADMIN — HYDROCHLOROTHIAZIDE 25 MG: 25 TABLET ORAL at 09:41

## 2025-05-02 RX ADMIN — SENNOSIDES AND DOCUSATE SODIUM 2 TABLET: 50; 8.6 TABLET ORAL at 09:41

## 2025-05-02 ASSESSMENT — PAIN SCALES - GENERAL: PAINLEVEL_OUTOF10: 0 - NO PAIN

## 2025-05-02 ASSESSMENT — PAIN - FUNCTIONAL ASSESSMENT: PAIN_FUNCTIONAL_ASSESSMENT: 0-10

## 2025-05-02 NOTE — CARE PLAN
The patient's goals for the shift include Patient will be free fromfalls injury this shift    The clinical goals for the shift include Patient will remain safe have adequate O2 sat during shift    Over the shift, the patient did not make progress toward the following goals. Barriers to progression include acute disease process. Recommendations to address these barriers include adherence to treatment plan

## 2025-05-02 NOTE — CARE PLAN
The patient's goals for the shift include Patient will be free fromfalls injury this shift    The clinical goals for the shift include Pt will be free from falls or injury by the end of this shift.    Over the shift, the patient did make progress toward the following goals. She was started on her Eliquis today and discharged home with her son via private car. Pt's IV removed with the tip intact. Pt is agreeable to her discharge instructions and follow-up appointments.       Problem: Pain - Adult  Goal: Verbalizes/displays adequate comfort level or baseline comfort level  Outcome: Progressing     Problem: Safety - Adult  Goal: Free from fall injury  Outcome: Progressing     Problem: Discharge Planning  Goal: Discharge to home or other facility with appropriate resources  Outcome: Progressing     Problem: Chronic Conditions and Co-morbidities  Goal: Patient's chronic conditions and co-morbidity symptoms are monitored and maintained or improved  Outcome: Progressing     Problem: Nutrition  Goal: Nutrient intake appropriate for maintaining nutritional needs  Outcome: Progressing     Problem: Fall/Injury  Goal: Not fall by end of shift  Outcome: Progressing  Goal: Be free from injury by end of the shift  Outcome: Progressing  Goal: Verbalize understanding of personal risk factors for fall in the hospital  Outcome: Progressing  Goal: Verbalize understanding of risk factor reduction measures to prevent injury from fall in the home  Outcome: Progressing  Goal: Use assistive devices by end of the shift  Outcome: Progressing  Goal: Pace activities to prevent fatigue by end of the shift  Outcome: Progressing     Problem: Pain  Goal: LTG - Patient will manage pain with the appropriate technique/Intervention  Outcome: Met  Goal: LTG - Patient will demonstrate intervention for managing pain  Outcome: Met  Goal: STG - Patient will reduce or eliminate use of analgesics  Outcome: Met  Goal: STG - Pain is manageable through  therapies  Outcome: Met  Goal: STG - Patient will verbalize an acceptable level of pain  Outcome: Met  Goal: STG - Patients pain is managed to allow active participation in daily activities  Outcome: Met  Goal: STG - Patient will increase activity level  Outcome: Met  Goal: STG - Patient verbalizes a reduction in pain level  Outcome: Met

## 2025-06-19 ENCOUNTER — OFFICE VISIT (OUTPATIENT)
Dept: PULMONOLOGY | Facility: HOSPITAL | Age: 89
End: 2025-06-19
Payer: MEDICARE

## 2025-06-19 VITALS
OXYGEN SATURATION: 98 % | WEIGHT: 149 LBS | TEMPERATURE: 97.2 F | DIASTOLIC BLOOD PRESSURE: 89 MMHG | BODY MASS INDEX: 26.39 KG/M2 | HEART RATE: 80 BPM | SYSTOLIC BLOOD PRESSURE: 181 MMHG

## 2025-06-19 DIAGNOSIS — I10 HYPERTENSION, UNSPECIFIED TYPE: ICD-10-CM

## 2025-06-19 DIAGNOSIS — I10 PRIMARY HYPERTENSION: ICD-10-CM

## 2025-06-19 DIAGNOSIS — I26.99 PE (PULMONARY THROMBOEMBOLISM) (MULTI): Primary | ICD-10-CM

## 2025-06-19 PROCEDURE — 1160F RVW MEDS BY RX/DR IN RCRD: CPT | Performed by: NURSE PRACTITIONER

## 2025-06-19 PROCEDURE — 99204 OFFICE O/P NEW MOD 45 MIN: CPT | Performed by: NURSE PRACTITIONER

## 2025-06-19 PROCEDURE — 1126F AMNT PAIN NOTED NONE PRSNT: CPT | Performed by: NURSE PRACTITIONER

## 2025-06-19 PROCEDURE — 3079F DIAST BP 80-89 MM HG: CPT | Performed by: NURSE PRACTITIONER

## 2025-06-19 PROCEDURE — 99212 OFFICE O/P EST SF 10 MIN: CPT | Performed by: NURSE PRACTITIONER

## 2025-06-19 PROCEDURE — 1036F TOBACCO NON-USER: CPT | Performed by: NURSE PRACTITIONER

## 2025-06-19 PROCEDURE — 1159F MED LIST DOCD IN RCRD: CPT | Performed by: NURSE PRACTITIONER

## 2025-06-19 PROCEDURE — 3077F SYST BP >= 140 MM HG: CPT | Performed by: NURSE PRACTITIONER

## 2025-06-19 RX ORDER — APIXABAN 5 MG/1
5 TABLET, FILM COATED ORAL 2 TIMES DAILY
Qty: 60 TABLET | Refills: 3 | Status: SHIPPED | OUTPATIENT
Start: 2025-06-19

## 2025-06-19 RX ORDER — HYDROCHLOROTHIAZIDE 25 MG/1
25 TABLET ORAL DAILY
Qty: 30 TABLET | Refills: 1 | Status: SHIPPED | OUTPATIENT
Start: 2025-06-19 | End: 2025-08-18

## 2025-06-19 ASSESSMENT — ENCOUNTER SYMPTOMS
TREMORS: 0
BRUISES/BLEEDS EASILY: 0
SLEEP DISTURBANCE: 0
APPETITE CHANGE: 0
TROUBLE SWALLOWING: 0
RHINORRHEA: 0
ROS GI COMMENTS: DENIES ACID REFLUX
CHEST TIGHTNESS: 0
ACTIVITY CHANGE: 0
CHILLS: 0
FEVER: 0
STRIDOR: 0
SORE THROAT: 0
DIZZINESS: 0
DIARRHEA: 0
UNEXPECTED WEIGHT CHANGE: 0
WHEEZING: 0
VOICE CHANGE: 0
EYE ITCHING: 0
AGITATION: 0
SINUS PAIN: 0
WEAKNESS: 0
BACK PAIN: 1
HEADACHES: 0
SINUS PRESSURE: 0
NERVOUS/ANXIOUS: 0
SHORTNESS OF BREATH: 0
NAUSEA: 0
FATIGUE: 1
ARTHRALGIAS: 1
PALPITATIONS: 0
MYALGIAS: 0
VOMITING: 0
ABDOMINAL PAIN: 0
JOINT SWELLING: 0
COUGH: 0
EYE REDNESS: 0

## 2025-06-19 ASSESSMENT — PAIN SCALES - GENERAL: PAINLEVEL_OUTOF10: 0-NO PAIN

## 2025-06-19 NOTE — PATIENT INSTRUCTIONS
Today we discussed your pulmonary history, recent hospitalization and plan of care moving forward.    - Resume Eliquis; 5 mg twice a day.  You need to remain on this medication daily for at least 3 months minimum.  The vascular specialist will let you know if you need to continue this medication for a longer period of time.  - Referral to vascular medicine  - Referral to cardiology due to your high blood pressure  - Please go to the ER if you start to develop blurred vision, light headed, dizzy, chest pain or shortness of breath.    Thank you for visiting the pulmonary clinic today! It was a pleasure to participate in your care.  Please return to clinic if needed.    Nigel Gallego, CNP  My Office Number: [phone]   CT Scheduling: [phone]  PFT (Pulmonary Function Test) Scheduling: [phone]  Follow Up Visit Scheduling: [phone]  My Nurse: Mali Zapien RN & Hazel Palacios, RN    To reach the nurse, Mali Zapien RN, please call ([phone]). If unable to reach [place], please contact Hazel Palacios, RN at ([phone]). Both nurses have secure voicemail's if needing to leave a message.    **For urgent needs such as medication issues/refills, active sick symptoms or medical concerns please call the office directly and speak to the pulmonary nurse. For nonurgent messages please use [employer]. Thank you.**

## 2025-06-19 NOTE — PROGRESS NOTES
Patient: Elba Barrow    MRN: [id number]  : 1935 -- AGE: 89 y.o.    Provider: ELGIN Mann     Location [facility]   Service Date: 2025       Department of Medicine  Division of Pulmonary, Critical Care, and Sleep Medicine       [facility]  New Visit Note    HISTORY OF PRESENT ILLNESS     The patient's referring provider is: No ref. provider found    PCP: None    HISTORY OF PRESENT ILLNESS   Elba Barrow is a 89 y.o. female who presents to [facility] for an evaluation with concerns of No chief complaint on file.. I have independently interviewed and examined the patient in the office and reviewed available records.    Current History  Patient presents to pulmonary clinic today for new patient establishment; concern of PE.  Patient had a hospital admission 2025 through 25 for PE.  CTA chest on 2025 showing extensive bilateral pulmonary emboli involving all lobes distal to the main pulmonary arteries with moderate embolic burden.  She was started on IV heparin and admitted.  She initially required 2 L nasal cannula but was able to be weaned to room air prior to discharge.  Echocardiogram on 2025 showed a normal EF along with mildly enlarged RA but normal size and function of the RV.  She was transitioned to Eliquis prior to hospital discharge.    On today's visit, the patient states she does not deal with any SOB at rest or TOWNSEND. If she does multiple flights of stairs she will feel a little TOWNSEND but nothing that interferes with her ADL's. Has never had another clotting event in her life. Did not have any recent travel. No long distance travel/airplanes. Not on any hormone therapy. No recent surgery. She ran out of [employer] about 2 weeks ago and has not been on any anticoagulation since. Does not have a PCP.    Currently, patient reports symptoms of:   []None  []SOB at  Rest               []TOWNSEND              [x]Cough: Dry; mild, intermittent. Denies hemoptysis.          []Wheezing               []Chest Tightness  []Orthopnea   []Lower Leg Edema   []Chest Pain  []Palpitations   []GERD   [x]Rhinitis; mild, intermittent  []Throat Clearing  []Voice Hoarseness    Prior Pulmonary History:   []None  []Born Premature  []Pulmonary Issues in Childhood  [x]Pulmonary Focused Hospitalizations; PE 5/1/25  []Hx of Home Oxygen Use    Prior/Current Inhaler History:   [x]None                         []ICS:        []JERRY:    []ICS/LABA:       []JERRY/LACI:   []ICS/LABA/LAMA:   []LABA:    []Other:   []LAMA:   []LABA/LAMA:     Sleep History:  [x]None     []Witnessed Apneic Events/Abnormal Breathing Patterns []Dozing Off Easily  []Completed a Sleep Study in the Past []Waking Up Choking/Gasping    []Daytime Napping  []Has Been Diagnosed with JENNA  []Morning Headaches     []Wears CPAP/BiPAP  []Snoring     []Excessive Daytime Fatigue     []Wears Nocturnal Oxygen    Prior Notes & History   Admitted 5/1/25 - 5/2/25 for PE   Hospital Course   Ms Barrow is a 89y female with PMHx: HTN who presents to the ED today with complaint of shortness of breath for the past 3 weeks. Initially, her BP was severely elevated at 230/115. Labs were mainly WNL also with her creatinine at 1.13. Troponin was 41-60 this is likely in the setting of PE. D-dimer was over 7000. Hgb 10.7. CXR showed some mild pulmonary congestion with an enlarged cardiac silhouette. CT for PE showed extensive bilateral pulmonary emboli involving all lobes distal to the main pulmonary arteries with moderate embolic burden. She was started on IV heparin and admitted to the medicine team. Initially the patient required 2L NC, but was able to be weaned to RA. Echo showed normal EF with mildly elevated right ventricular pressure. The patient was subsequently started on Eliquis. She will follow-up to determine length of therapy.     REVIEW OF SYSTEMS     REVIEW  OF SYSTEMS  Review of Systems   Constitutional:  Positive for fatigue. Negative for activity change, appetite change, chills, fever and unexpected weight change.   HENT:  Negative for congestion, postnasal drip, rhinorrhea, sinus pressure, sinus pain, sneezing, sore throat, trouble swallowing and voice change.         Denies throat clearing   Eyes:  Negative for redness and itching.   Respiratory:  Negative for cough, chest tightness, shortness of breath, wheezing and stridor.    Cardiovascular:  Negative for chest pain, palpitations and leg swelling.        Denies orthopnea   Gastrointestinal:  Negative for abdominal pain, diarrhea, nausea and vomiting.        Denies acid reflux   Musculoskeletal:  Positive for arthralgias and back pain. Negative for joint swelling and myalgias.   Skin:  Negative for rash.   Allergic/Immunologic: Negative for immunocompromised state.   Neurological:  Negative for dizziness, tremors, weakness and headaches.   Hematological:  Does not bruise/bleed easily.   Psychiatric/Behavioral:  Negative for agitation and sleep disturbance. The patient is not nervous/anxious.         Denies depression   All other systems reviewed and are negative.      ALLERGIES & MEDICATIONS     ALLERGIES  Allergies[1]    MEDICATIONS  Current Medications[2]    PAST HISTORY     PAST MEDICAL HISTORY  She  has a past medical history of Abnormal findings on diagnostic imaging of other specified body structures (04/18/2022), Anemia, Chronic kidney disease, Hypertension, Illness, unspecified (04/18/2022), and Unspecified cataract (10/02/2015).    PAST SURGICAL HISTORY  Surgical History[3]    IMMUNIZATION HISTORY  Immunization History   Administered Date(s) Administered    COVID-19, mRNA, LNP-S, PF, 30 mcg/0.3 mL dose 03/18/2021, 04/08/2021, 12/03/2021    Pfizer COVID-19 vaccine, 12 years and older, (30mcg/0.3mL) (Comirnaty) 10/04/2023, 11/01/2024       SOCIAL HISTORY  She  reports that she quit smoking about 50 years  ago. Her smoking use included cigarettes. She has never used smokeless tobacco. She reports that she does not drink alcohol and does not use drugs.   Began age [age]. Averaged 0.25 ppd.    OCCUPATIONAL/ENVIRONMENTAL HISTORY  Previously worked as: nurse aid,  and cleaning  Currently works as: retired  Exposure Hx:  [x]None  []Asbestos  []Silica  []Beryllium/Inhaled Metals  []Birds  []Exotic Animals  []Other      FAMILY HISTORY  Family History[4]    PHYSICAL EXAM     VITAL SIGNS: There were no vitals taken for this visit.     PREVIOUS WEIGHTS:  Wt Readings from Last 3 Encounters:   05/01/25 63.5 kg (140 lb)   09/20/22 66.9 kg (147 lb 9 oz)   05/06/22 68 kg (150 lb)       Physical Exam  Vitals reviewed.   Constitutional:       General: She is not in acute distress.     Appearance: Normal appearance. She is not ill-appearing or toxic-appearing.   HENT:      Head: Normocephalic.      Nose: No rhinorrhea.   Cardiovascular:      Rate and Rhythm: Normal rate and regular rhythm.      Heart sounds: Normal heart sounds.   Pulmonary:      Effort: Pulmonary effort is normal. No respiratory distress.      Breath sounds: Normal breath sounds. No stridor. No wheezing, rhonchi or rales.   Abdominal:      General: Abdomen is flat.   Musculoskeletal:         General: Normal range of motion.      Right lower leg: No edema.      Left lower leg: No edema.   Skin:     General: Skin is warm and dry.      Nails: There is no clubbing.   Neurological:      General: No focal deficit present.      Mental Status: She is alert and oriented to person, place, and time.   Psychiatric:         Mood and Affect: Mood normal.         Behavior: Behavior normal.         Judgment: Judgment normal.       RESULTS/DATA     Pulmonary Function Test Results     No PFT on record    Chest Radiograph   CXR   5/1/25: IMPRESSION: *Cardiac silhouette enlarged, new since the comparison Examination. *Mild pulmonary vascular congestion.     Chest CT Scan  "  CTA Chest   5/1/25: IMPRESSION: 1.Extensive bilateral pulmonary emboli involving all lobes distal to the main pulmonary arteries with moderate embolic burden. 2.Mild diffuse cardiac enlargement with question mild right heart dilation. Question mild right heart strain. 3.Patchy groundglass areas airspace consolidation in the superior segment right lower lobe. Correlate clinically for possible small pulmonary infarct. 4.Mild emphysema. 5.Severe coronary artery calcifications.     Echocardiogram & Cardiac Studies   Echo   5/2/25: CONCLUSIONS:   1. Left ventricular ejection fraction is normal, by visual estimate at 55-60%.   2. Left ventricular diastolic filling is indeterminate.   3. There is normal right ventricular global systolic function.   4. Mild to moderately elevated right ventricular systolic pressure.   5. Normal aortic root.      Labwork & Pathology   Complete Blood Count  Lab Results   Component Value Date    WBC 7.2 05/01/2025    HGB 10.7 (L) 05/01/2025    HCT 31.0 (L) 05/01/2025    MCV 88 05/01/2025     05/01/2025     Peripheral Eosinophil Count:   Eosinophils Absolute   Date Value   05/01/2025 0.02 x10*3/uL   04/28/2022 0.03 x10E9/L   04/28/2022 0.02 x10E9/L   04/20/2022 0.05 x10E9/L     Immunoglobulin IgE  No results found for: \"IGE\"    B-Type Natriuretic Peptide  Lab Results   Component Value Date     (H) 05/01/2025     Bronchoscopy & Pathology/Cultures       ASSESSMENT/PLAN     Ms. Barrow is a 89 y.o. female; was referred to the [facility] for evaluation of No chief complaint on file.    Problem List and Orders  Diagnoses and all orders for this visit:  PE (pulmonary thromboembolism) (Multi)  -     Referral to Cardiology; Future  -     Eliquis 5 mg tablet; Take 1 tablet (5 mg) by mouth 2 times a day.  Hypertension, unspecified type  -     Referral to Cardiology; Future      Assessment and Plan / Recommendations:  PE: hospital admission 5/1/2025 " through 5/2/25 for PE.  CTA chest on 5/1/2025 showing extensive bilateral pulmonary emboli involving all lobes distal to the main pulmonary arteries with moderate embolic burden.  She was started on IV heparin and admitted.  She initially required 2 L nasal cannula but was able to be weaned to room air prior to discharge.  Echocardiogram on 5/2/2025 showed a normal EF along with mildly enlarged RA but normal size and function of the RV.  She was transitioned to Eliquis prior to hospital discharge.  -Referral to vascular medicine  -Resume Eliquis 5 mg BID (had been off for 2 weeks due to running out; no PCP. I sent in 3-4 months worth of medication)  -No notable concern for right heart strain on echocardiogram from 5/2/25; should not need a repeat echo  -Asymptomatic from a pulmonary standpoint  -This was her first ever clotting event in her lifetime. Unprovoked. Will likely need at minimum 3 months of anticoagulation; will defer to vascular for total length of treatment.    2. HTN  -Referral to cardiology    RTC PRN    Nigel Gallego CNP  [facility] Nurse Practitioner    *This note was dictated using DRAGON speech recognition software and was corrected for spelling or grammatical errors, but despite proofreading several typographical errors might be present that might affect the meaning of the content.*          [1] No Known Allergies  [2]   Current Outpatient Medications   Medication Sig Dispense Refill    acetaminophen (Tylenol) 325 mg tablet Take 2 tablets (650 mg) by mouth every 6 hours if needed for mild pain (1 - 3), moderate pain (4 - 6), headaches or fever (temp greater than 38.0 C).      apixaban (Eliquis) 5 mg (74 tabs) tablet Take 2 tablets (10 mg) by mouth 2 times a day for 7 days, then take 1 tablet (5 mg) by mouth 2 times a day. 74 tablet 0    hydroCHLOROthiazide (HYDRODiuril) 25 mg tablet Take 1 tablet (25 mg) by mouth once daily. 30 tablet 1    pantoprazole (ProtoNix) 40 mg EC tablet Take  1 tablet (40 mg) by mouth 2 times a day. Do not crush, chew, or split. 60 tablet 0     No current facility-administered medications for this visit.   [3]   Past Surgical History:  Procedure Laterality Date    MASTECTOMY  10/21/2013    Breast Surgery Mastectomy   [4] No family history on file.

## 2025-07-17 ENCOUNTER — TELEPHONE (OUTPATIENT)
Dept: CARDIOLOGY | Facility: HOSPITAL | Age: 89
End: 2025-07-17
Payer: MEDICARE

## 2025-07-18 NOTE — PROGRESS NOTES
"[facility] [place]       Primary Care Physician: No Assigned PCP Generic Provider, MD  Date of Visit: 07/21/2025  3:20 PM EDT     HPI / Summary:   Elba Barrow is a 89 y.o. female with HTN, pulmonary embolism (5/2025) on anticoagulation, left breast cancer s/p chemo and mastectomy (1980s) who was referred for evaluation of HTN.     Pulmonary referred to vascular medicine for management of pulmonary embolism and to cardiology for management of HTN.     On 7/21/25, her BP was reasonably controlled at 134/74. She has never had a colonoscopy. No changes in her stool, are not black in color or thin. She ran out of [employer] on 7/20/25. She doesn't have a PCP.     ROS: Relevant review of symptoms of negative unless discussed above.     Problems:   Problem List[1]    Medical History:   Medical History[2]    Surgical Hx:   Surgical History[3]     Family Hx:   Family History[4]     Social Hx:  Occupation/School: retired   EtOH/Smoking/Drugs: no    Medications  Current Outpatient Medications   Medication Instructions    acetaminophen (TYLENOL) 650 mg, oral, Every 6 hours PRN    Eliquis 5 mg, oral, 2 times daily    hydroCHLOROthiazide (HYDRODIURIL) 25 mg, oral, Daily    pantoprazole (PROTONIX) 40 mg, oral, 2 times daily, Do not crush, chew, or split.       Allergies  Patient has no known allergies.    Exam:   Vitals: /74 (BP Location: Right arm, Patient Position: Sitting, BP Cuff Size: Adult) Comment (BP Location): use Rt arm only r/t lft sided masectomy  Pulse 81   Ht 1.6 m (5' 3\")   Wt 63.5 kg (140 lb)   BMI 24.80 kg/m²   Wt Readings from Last 5 Encounters:   07/21/25 63.5 kg (140 lb)   06/19/25 67.6 kg (149 lb)   05/01/25 63.5 kg (140 lb)   09/20/22 66.9 kg (147 lb 9 oz)   05/06/22 68 kg (150 lb)     GEN: Pleasant, well-appearing, no acute distress.  HEENT: JVP not elevated  CHEST: Clear to auscultation, No wheeze, good air movement.  CV: normal rate, regular " "rhythm, no murmurs or rubs  EXT: left arm lymphedema  NEURO: grossly non focal  SKIN: No obvious rashes     Labs:   Lipids  Lab Results   Component Value Date    CHOL 212 (H) 04/28/2022     Lab Results   Component Value Date    HDL 89.9 04/28/2022     No results found for: \"LDLCALC\"  Lab Results   Component Value Date    TRIG 60 04/28/2022     No components found for: \"CHOLHDL\"    Hemoglobin A1C  Lab Results   Component Value Date    HGBA1C 5.4 04/08/2019       BMP  Lab Results   Component Value Date    GLUCOSE 97 05/01/2025    CALCIUM 8.8 05/01/2025     05/01/2025    K 4.0 05/01/2025    CO2 23 05/01/2025     05/01/2025    BUN 19 05/01/2025    CREATININE 1.13 (H) 05/01/2025         Notable Studies: imaging personally reviewed and summarized by me below  EKG:  -7/21/25: NSR, LVH with minor repol, HR 81 bpm,    Echo:  -5/2/2025: LVEF 55-60%, concentric LVH, mild TR, RVSP 46 mmHg, normal RV size and function.       Assessment and Plan  Elba Barrow is a 89 y.o. female with HTN, pulmonary embolism (5/2025) on anticoagulation, left breast cancer s/p chemo and mastectomy (1980s) who was referred for evaluation of HTN.     Pulmonary referred to vascular medicine for management of pulmonary embolism and to cardiology for management of HTN.     On 7/21/25, her BP was reasonably controlled at 134/74. She has never had a colonoscopy. No changes in her stool, are not black in color or thin. She ran out of [employer] on 7/20/25. She doesn't have a PCP.     -continue hydrochlorothiazide. Refill provided.   -Pulmonary is managing her pulmonary embolism until she sees vascular medicine. Refill provided again on 7/21. No clear inciting factor though she does have a history of breast cancer s/p chemo and radiation. No bleeding issues currently.   -referral to primary care to coordinate her care.     Follow up in 1 year.     Sanjay Light MD  Director, Sports Cardiology  Director, Hypertrophic Cardiomyopathy " Center    Part of this note was completed using dictation and voice recognition software. Please excuse minor errors and typos.            [1]   Patient Active Problem List  Diagnosis    PE (pulmonary thromboembolism) (Multi)    Osteopenia    Hypertension    Glaucoma suspect, both eyes    Dyslipidemia    Cataract    Anemia    Stage 3a chronic kidney disease (Multi)   [2]   Past Medical History:  Diagnosis Date    Abnormal findings on diagnostic imaging of other specified body structures 04/18/2022    Abnormal CXR (chest x-ray)    Anemia     Chronic kidney disease     Hypertension     Illness, unspecified 04/18/2022    Taking medication for chronic disease    Unspecified cataract 10/02/2015    Cataract of left eye   [3]   Past Surgical History:  Procedure Laterality Date    MASTECTOMY  10/21/2013    Breast Surgery Mastectomy   [4] No family history on file.

## 2025-07-21 ENCOUNTER — OFFICE VISIT (OUTPATIENT)
Dept: CARDIOLOGY | Facility: HOSPITAL | Age: 89
End: 2025-07-21
Payer: MEDICARE

## 2025-07-21 ENCOUNTER — HOSPITAL ENCOUNTER (OUTPATIENT)
Dept: CARDIOLOGY | Facility: HOSPITAL | Age: 89
Discharge: HOME | End: 2025-07-21
Payer: MEDICARE

## 2025-07-21 VITALS
HEIGHT: 63 IN | SYSTOLIC BLOOD PRESSURE: 134 MMHG | DIASTOLIC BLOOD PRESSURE: 74 MMHG | HEART RATE: 81 BPM | WEIGHT: 140 LBS | BODY MASS INDEX: 24.8 KG/M2

## 2025-07-21 DIAGNOSIS — I10 HYPERTENSION, UNSPECIFIED TYPE: Primary | ICD-10-CM

## 2025-07-21 DIAGNOSIS — I26.99 PE (PULMONARY THROMBOEMBOLISM) (MULTI): ICD-10-CM

## 2025-07-21 DIAGNOSIS — I10 PRIMARY HYPERTENSION: ICD-10-CM

## 2025-07-21 PROCEDURE — 93010 ELECTROCARDIOGRAM REPORT: CPT | Performed by: INTERNAL MEDICINE

## 2025-07-21 PROCEDURE — 99203 OFFICE O/P NEW LOW 30 MIN: CPT | Performed by: INTERNAL MEDICINE

## 2025-07-21 PROCEDURE — 93005 ELECTROCARDIOGRAM TRACING: CPT

## 2025-07-21 PROCEDURE — 99203 OFFICE O/P NEW LOW 30 MIN: CPT

## 2025-07-21 PROCEDURE — 1159F MED LIST DOCD IN RCRD: CPT | Performed by: INTERNAL MEDICINE

## 2025-07-21 PROCEDURE — 3075F SYST BP GE 130 - 139MM HG: CPT | Performed by: INTERNAL MEDICINE

## 2025-07-21 PROCEDURE — 3078F DIAST BP <80 MM HG: CPT | Performed by: INTERNAL MEDICINE

## 2025-07-21 RX ORDER — APIXABAN 5 MG/1
5 TABLET, FILM COATED ORAL 2 TIMES DAILY
Qty: 180 TABLET | Refills: 2 | Status: SHIPPED | OUTPATIENT
Start: 2025-07-21

## 2025-07-21 RX ORDER — HYDROCHLOROTHIAZIDE 25 MG/1
25 TABLET ORAL DAILY
Qty: 90 TABLET | Refills: 2 | Status: SHIPPED | OUTPATIENT
Start: 2025-07-21 | End: 2026-01-17

## 2025-07-21 ASSESSMENT — ENCOUNTER SYMPTOMS
OCCASIONAL FEELINGS OF UNSTEADINESS: 0
DEPRESSION: 0
LOSS OF SENSATION IN FEET: 0

## 2025-07-22 LAB
ATRIAL RATE: 81 BPM
P AXIS: 57 DEGREES
P OFFSET: 200 MS
P ONSET: 140 MS
PR INTERVAL: 158 MS
Q ONSET: 219 MS
QRS COUNT: 13 BEATS
QRS DURATION: 90 MS
QT INTERVAL: 386 MS
QTC CALCULATION(BAZETT): 448 MS
QTC FREDERICIA: 426 MS
R AXIS: 4 DEGREES
T AXIS: -21 DEGREES
T OFFSET: 412 MS
VENTRICULAR RATE: 81 BPM

## 2025-07-22 PROCEDURE — 93005 ELECTROCARDIOGRAM TRACING: CPT
